# Patient Record
Sex: FEMALE | Race: ASIAN | Employment: FULL TIME | URBAN - METROPOLITAN AREA
[De-identification: names, ages, dates, MRNs, and addresses within clinical notes are randomized per-mention and may not be internally consistent; named-entity substitution may affect disease eponyms.]

---

## 2017-09-10 ENCOUNTER — HOSPITAL ENCOUNTER (EMERGENCY)
Facility: CLINIC | Age: 47
Discharge: HOME | End: 2017-09-10
Attending: PHYSICIAN ASSISTANT | Admitting: PHYSICIAN ASSISTANT
Payer: COMMERCIAL

## 2017-09-10 ENCOUNTER — APPOINTMENT (OUTPATIENT)
Dept: GENERAL RADIOLOGY | Facility: CLINIC | Age: 47
End: 2017-09-10
Attending: PHYSICIAN ASSISTANT
Payer: COMMERCIAL

## 2017-09-10 VITALS
TEMPERATURE: 98.1 F | OXYGEN SATURATION: 96 % | HEART RATE: 67 BPM | DIASTOLIC BLOOD PRESSURE: 64 MMHG | RESPIRATION RATE: 16 BRPM | WEIGHT: 115 LBS | SYSTOLIC BLOOD PRESSURE: 124 MMHG

## 2017-09-10 DIAGNOSIS — S62.232A OTHER CLOSED DISPLACED FRACTURE OF BASE OF FIRST METACARPAL BONE OF LEFT HAND, INITIAL ENCOUNTER: ICD-10-CM

## 2017-09-10 PROCEDURE — 73140 X-RAY EXAM OF FINGER(S): CPT | Mod: LT

## 2017-09-10 PROCEDURE — 26600 TREAT METACARPAL FRACTURE: CPT | Mod: LT

## 2017-09-10 PROCEDURE — 99284 EMERGENCY DEPT VISIT MOD MDM: CPT | Mod: 25

## 2017-09-10 ASSESSMENT — ENCOUNTER SYMPTOMS
NUMBNESS: 0
WEAKNESS: 0
WOUND: 0
ARTHRALGIAS: 1
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
## 2017-09-10 NOTE — DISCHARGE INSTRUCTIONS
Use Acetaminophen and/or Ibuprofen as needed for pain.      Elevate and follow-up with Orthopedic Hand Specialist in 3-5 days.     Return to the ED with worsening pain, numbness, tingling or if you become worse in any way.         Discharge Instructions  Extremity Injury    You were seen today for an injury to an extremity (arm, hand, leg, or foot). You may have a bruise, strain, or fracture (broken bone).    Return to the Emergency Department or see your regular doctor if your injured area is not back to normal within 5-7 days.    Return to the Emergency Department right away if:    Your pain seems to change or get worse or there is pain in a new area.    Your extremity becomes pale, cool, blue, or numb or tingling past the injury.    You have more drainage, redness or pain in the area of the cut or abrasion.    You have pain that you can t control with the medicine recommended or prescribed here, or you have pain that seems too much for your injury.    Your child will not stop crying or is much more fussy than normal.    You have new symptoms or anything that worries you.    What to Expect:    Your swelling and pain may be worse the day after your injury, but should not be severe and should start getting better after that. You should not have new symptoms and your pain should not get worse.    You may start to get a bruise over the injured area or below the injured area.    Your movement and strength should get better with time.    Some injuries may not show up until after you have left the Emergency Department so it is important to follow-up with your regular doctor.    Your injury may prevent you from working.  Follow-up with your regular doctor to get a work release note.    Pain medications or your injury may make it unsafe to drive or operate machinery.    Home Care:    Apply ice your injured area for 15 minutes at a time, at least 3 times a day. Use a cloth between the ice bag and your skin to prevent  frostbite.     Do not sleep with an ice pack or heating pad on, since this can cause burns or skin injury.    Rest your injured area for at least 1-2 days. After that you may start using your extremity again as long as there is not too much pain.     Raise the injured area above the level of your heart as much as possible in the first 1-2 days.    Use Tylenol  (acetaminophen), Motrin (ibuprofen), or Advil  (ibuprofen) for your pain unless you have an allergy or are told not to use these medications by your doctor.  Take the medications as instructed on the package. Tylenol  (acetaminophen) is in many prescription medicines and non-prescription medicines--check all of your medicines to be sure you aren t taking more than 3000 mg per day.    You may use an elastic bandage (Ace  Wrap) if it makes you more comfortable. Wrap it just tight enough to provide light compression, like a new pair of socks feels. Loosen the bandage if you have swelling past the bandage.      Please follow any other instructions that were discussed with you by your doctor.    MORE INFORMATION:    X-rays:  X-rays done today were read by your doctor but will also be read by a radiologist.  We will contact you if the radiologist sees anything different on the x-ray.  Your regular doctor may also want to review your x-rays on follow-up.    You could have a fracture (break), even if we told you your x-rays were normal. X-rays are not always certain, and some fractures are hard to see and may not show up right away.  Also, your x-ray may look like you have a fracture, even though you do not.  It is important to follow-up with your regular doctor.     Stretching:  If your injury was to your arm or shoulder and your doctor put you in a sling or an immobilizer, it is important that you take off your immobilizer within 3 days and stretch/move your shoulder, unless your doctor specifically tells you to not move your shoulder.  This is to prevent further  injury such as a  frozen shoulder .     If you were given a prescription for medicine here today, be sure to read all of the information (including the package insert) that comes with your prescription.  This will include important information about the medicine, its side effects, and any warnings that you need to know about.  The pharmacist who fills the prescription can provide more information and answer questions you may have about the medicine.  If you have questions or concerns that the pharmacist cannot address, please call or return to the Emergency Department.     Remember that you can always come back to the Emergency Department if you are not able to see your regular doctor in the amount of time listed above, if you get any new symptoms, or if there is anything that worries you.    Discharge Instructions  Splint Care    You had a splint put on today to help protect your injury and help it heal.  Splints are used to treat things like strains, sprains, cuts and fractures (broken bones).    Be sure your splint is not too tight!  If you splint is too tight, it may cause loss of blood supply.  Signs of your splint being too tight include:  your arm or leg hurting a lot more; your fingers or toes getting numb, cold, pale or blue; or your child is crying, fussing or seeming restless.    Return to the Emergency Department right away if:    You have increased pain or pressure around the injury.    You have numbness, tingling, or cool, pale, or blue toes or fingers past the injury.    Your child is more fussy than normal, crying a lot, or restless.    Your splint becomes soft, breaks, or is wet.    Your splint begins to smell bad.    Your splint is cutting into your skin.    Home care:    Keep the injured area above the level of your heart while laying or sitting down.  This will help decrease the swelling and the pain.    Keep the splint dry.    Do not put objects down or inside the splint.    If there is an  elastic bandage (Ace  wrap) holding the splint on this may be loosened slightly to relieve pressure or pain.  If pain continues return to the Emergency Department right away.    Do not remove your splint by yourself unless told to by your doctor.    Follow-up:  Sometimes the splint put on in the Emergency Department needs to be changed once the swelling has gone down and a more permanent cast needs to be placed.  This is usually done by a bone specialist doctor (Orthopedist).  Follow the instructions given to you by your doctor today.    X-rays:  X-rays done today were read by your doctor but will also be read by a radiologist.  We will contact you if the radiologist sees anything different on the x-ray.  Your regular doctor may also want to review your x-rays on follow-up.    You could have a fracture (break), even if we told you your x-rays were normal. X-rays are not always certain, and some fractures are hard to see and may not show up right away.  Also, your x-ray may look like you have a fracture, even though you do not.  It is important to follow-up with your regular doctor.     If you were given a prescription for medicine here today, be sure to read all of the information (including the package insert) that comes with your prescription.  This will include important information about the medicine, its side effects, and any warnings that you need to know about.  The pharmacist who fills the prescription can provide more information and answer questions you may have about the medicine.  If you have questions or concerns that the pharmacist cannot address, please call or return to the Emergency Department.       Closed Thumb Fracture  You have a broken (fractured) thumb. This causes local pain, swelling, and often bruising. This injury will usually take about 4 to 6 weeks or longer to heal. Thumb fractures may be treated with a splint or cast. This protects the thumb and holds the bone in place while it heals.  More serious fractures may need surgery.     If the thumbnail has been severely injured, it may fall off in 1 to 2 weeks. A new thumbnail will usually start to grow back within a month.  Home care  Follow these guidelines when caring for yourself at home:    Keep your arm elevated to reduce pain and swelling. When sitting or lying down elevate your arm above the level of your heart. You can do this by placing your arm on a pillow that rests on your chest or on a pillow at your side. This is most important during the first 2 days (48 hours) after the injury.    Put an ice pack on the injured area. Do this for 20 minutes every 1 to 2 hours the first day for pain relief. You can make an ice pack by wrapping a plastic bag of ice cubes in a thin towel. As the ice melts, be careful that the cast or splint doesn t get wet. Continue using the ice pack 3 to 4 times a day for the next 2 days. Then use the ice pack as needed to ease pain and swelling.    If a splint was put on, leave this in place for the time advised. This will keep the bones from moving out of position.    Keep the cast or splint completely dry at all times. Bathe with your cast or splint out of the water. Protect it with a large plastic bag, rubber-banded at the top end. If a fiberglass cast or splint gets wet, you can dry it with a hair dryer.    You may use acetaminophen or ibuprofen to control pain, unless another pain medicine was prescribed. If you have chronic liver or kidney disease, talk with your healthcare provider before using these medicines. Also talk with your provider if you ve had a stomach ulcer or gastrointestinal bleeding.    Don t put creams or objects under the cast if you have itching.  Follow-up care  Follow up with your healthcare provider in 1 week, or as advised. This is to make sure the bone is healing the way it should. Talk with your provider about when it is safe to return to sports or work.  X-rays may be taken. You will be  told of any new findings that may affect your care.  When to seek medical advice  Call your healthcare provider right away if any of these occur:    The cast or splint cracks    The plaster cast or splint becomes wet or soft    The fiberglass cast or splint stays wet for more than 24 hours    Bad odor from the cast or wound fluid stains the cast    Pain or swelling gets worse    Redness or warmth in the hand    Fingers or hand become cold, blue, numb, or tingly    You can t move your hand or fingers    Skin around cast or splint becomes red    Fever of 100.4 F (38 C) or higher, or as directed by your healthcare provider  Date Last Reviewed: 2/1/2017 2000-2017 [employer]. [address] [place] [address], [place] [id number]. All rights reserved. This information is not intended as a substitute for professional medical care. Always follow your healthcare professional's instructions.          Remember that you can always come back to the Emergency Department if you are not able to see your regular doctor in the amount of time listed above, if you get any new symptoms, or if there is anything that worries you.

## 2017-09-10 NOTE — ED AVS SNAPSHOT
Emergency Department    [address] [facility] [address]    Phone:  [phone]    Fax:  [phone]                                       Zenaida Blas   MRN: [id number]    Department:   Emergency Department   Date of Visit:  9/10/2017           After Visit Summary Signature Page     I have received my discharge instructions, and my questions have been answered. I have discussed any challenges I see with this plan with the nurse or doctor.    ..........................................................................................................................................  Patient/Patient Representative Signature      ..........................................................................................................................................  Patient Representative Print Name and Relationship to Patient    ..................................................               ................................................  Date                                            Time    ..........................................................................................................................................  Reviewed by Signature/Title    ...................................................              ..............................................  Date                                                            Time

## 2017-09-10 NOTE — ED PROVIDER NOTES
Emergency Department Attending Supervision Note  9/10/2017  3:53 PM      I evaluated this patient in conjunction with Beti KRAFT      Briefly, the patient presented with  left thumb pain after a fall. She denies numbness or tingling.      On my exam, cardiovascular: Regular rate and rhythm, brisk capillary refill in the injured thumb.  Musculoskeletal: tenderness at the base of the left thumb mild ecchymosis and edema.  Neurologic: Sensation intact distal to the injury.    My impression is left first metacarpal fracture, I agree with thumb spica splinting and close outpatient orthopedic follow-up per KARSTEN Garntet.        Rogers Mendoza Trigger       Trigger, Rogers Mendoza MD  09/10/17 2379

## 2017-09-10 NOTE — ED AVS SNAPSHOT
Emergency Department    [address] [facility] [address]    Phone:  [phone]    Fax:  [phone]                                       Zenaida Blas   MRN: [id number]    Department:   Emergency Department   Date of Visit:  9/10/2017           Patient Information     Date Of Birth          1970        Your diagnoses for this visit were:     Other closed displaced fracture of base of first metacarpal bone of left hand, initial encounter        You were seen by Beti Smith PA-C.      Follow-up Information     Follow up with  Emergency Department.    Specialty:  EMERGENCY MEDICINE    Why:  If symptoms worsen    Contact information:    [address] [facility] [address] [phone]        Follow up with Reyna Sheets MD.    Specialty:  Orthopedics    Contact information:    [facility]  [address] [place] [id number] [phone]          Discharge Instructions       Use Acetaminophen and/or Ibuprofen as needed for pain.      Elevate and follow-up with Orthopedic Hand Specialist in 3-5 days.     Return to the ED with worsening pain, numbness, tingling or if you become worse in any way.         Discharge Instructions  Extremity Injury    You were seen today for an injury to an extremity (arm, hand, leg, or foot). You may have a bruise, strain, or fracture (broken bone).    Return to the Emergency Department or see your regular doctor if your injured area is not back to normal within 5-7 days.    Return to the Emergency Department right away if:    Your pain seems to change or get worse or there is pain in a new area.    Your extremity becomes pale, cool, blue, or numb or tingling past the injury.    You have more drainage, redness or pain in the area of the cut or abrasion.    You have pain that you can t control with the medicine recommended or prescribed here, or you have pain that seems too much for your  injury.    Your child will not stop crying or is much more fussy than normal.    You have new symptoms or anything that worries you.    What to Expect:    Your swelling and pain may be worse the day after your injury, but should not be severe and should start getting better after that. You should not have new symptoms and your pain should not get worse.    You may start to get a bruise over the injured area or below the injured area.    Your movement and strength should get better with time.    Some injuries may not show up until after you have left the Emergency Department so it is important to follow-up with your regular doctor.    Your injury may prevent you from working.  Follow-up with your regular doctor to get a work release note.    Pain medications or your injury may make it unsafe to drive or operate machinery.    Home Care:    Apply ice your injured area for 15 minutes at a time, at least 3 times a day. Use a cloth between the ice bag and your skin to prevent frostbite.     Do not sleep with an ice pack or heating pad on, since this can cause burns or skin injury.    Rest your injured area for at least 1-2 days. After that you may start using your extremity again as long as there is not too much pain.     Raise the injured area above the level of your heart as much as possible in the first 1-2 days.    Use Tylenol  (acetaminophen), Motrin (ibuprofen), or Advil  (ibuprofen) for your pain unless you have an allergy or are told not to use these medications by your doctor.  Take the medications as instructed on the package. Tylenol  (acetaminophen) is in many prescription medicines and non-prescription medicines--check all of your medicines to be sure you aren t taking more than 3000 mg per day.    You may use an elastic bandage (Ace  Wrap) if it makes you more comfortable. Wrap it just tight enough to provide light compression, like a new pair of socks feels. Loosen the bandage if you have swelling past the  bandage.      Please follow any other instructions that were discussed with you by your doctor.    MORE INFORMATION:    X-rays:  X-rays done today were read by your doctor but will also be read by a radiologist.  We will contact you if the radiologist sees anything different on the x-ray.  Your regular doctor may also want to review your x-rays on follow-up.    You could have a fracture (break), even if we told you your x-rays were normal. X-rays are not always certain, and some fractures are hard to see and may not show up right away.  Also, your x-ray may look like you have a fracture, even though you do not.  It is important to follow-up with your regular doctor.     Stretching:  If your injury was to your arm or shoulder and your doctor put you in a sling or an immobilizer, it is important that you take off your immobilizer within 3 days and stretch/move your shoulder, unless your doctor specifically tells you to not move your shoulder.  This is to prevent further injury such as a  frozen shoulder .     If you were given a prescription for medicine here today, be sure to read all of the information (including the package insert) that comes with your prescription.  This will include important information about the medicine, its side effects, and any warnings that you need to know about.  The pharmacist who fills the prescription can provide more information and answer questions you may have about the medicine.  If you have questions or concerns that the pharmacist cannot address, please call or return to the Emergency Department.     Remember that you can always come back to the Emergency Department if you are not able to see your regular doctor in the amount of time listed above, if you get any new symptoms, or if there is anything that worries you.    Discharge Instructions  Splint Care    You had a splint put on today to help protect your injury and help it heal.  Splints are used to treat things like strains,  sprains, cuts and fractures (broken bones).    Be sure your splint is not too tight!  If you splint is too tight, it may cause loss of blood supply.  Signs of your splint being too tight include:  your arm or leg hurting a lot more; your fingers or toes getting numb, cold, pale or blue; or your child is crying, fussing or seeming restless.    Return to the Emergency Department right away if:    You have increased pain or pressure around the injury.    You have numbness, tingling, or cool, pale, or blue toes or fingers past the injury.    Your child is more fussy than normal, crying a lot, or restless.    Your splint becomes soft, breaks, or is wet.    Your splint begins to smell bad.    Your splint is cutting into your skin.    Home care:    Keep the injured area above the level of your heart while laying or sitting down.  This will help decrease the swelling and the pain.    Keep the splint dry.    Do not put objects down or inside the splint.    If there is an elastic bandage (Ace  wrap) holding the splint on this may be loosened slightly to relieve pressure or pain.  If pain continues return to the Emergency Department right away.    Do not remove your splint by yourself unless told to by your doctor.    Follow-up:  Sometimes the splint put on in the Emergency Department needs to be changed once the swelling has gone down and a more permanent cast needs to be placed.  This is usually done by a bone specialist doctor (Orthopedist).  Follow the instructions given to you by your doctor today.    X-rays:  X-rays done today were read by your doctor but will also be read by a radiologist.  We will contact you if the radiologist sees anything different on the x-ray.  Your regular doctor may also want to review your x-rays on follow-up.    You could have a fracture (break), even if we told you your x-rays were normal. X-rays are not always certain, and some fractures are hard to see and may not show up right away.  Also,  your x-ray may look like you have a fracture, even though you do not.  It is important to follow-up with your regular doctor.     If you were given a prescription for medicine here today, be sure to read all of the information (including the package insert) that comes with your prescription.  This will include important information about the medicine, its side effects, and any warnings that you need to know about.  The pharmacist who fills the prescription can provide more information and answer questions you may have about the medicine.  If you have questions or concerns that the pharmacist cannot address, please call or return to the Emergency Department.       Closed Thumb Fracture  You have a broken (fractured) thumb. This causes local pain, swelling, and often bruising. This injury will usually take about 4 to 6 weeks or longer to heal. Thumb fractures may be treated with a splint or cast. This protects the thumb and holds the bone in place while it heals. More serious fractures may need surgery.     If the thumbnail has been severely injured, it may fall off in 1 to 2 weeks. A new thumbnail will usually start to grow back within a month.  Home care  Follow these guidelines when caring for yourself at home:    Keep your arm elevated to reduce pain and swelling. When sitting or lying down elevate your arm above the level of your heart. You can do this by placing your arm on a pillow that rests on your chest or on a pillow at your side. This is most important during the first 2 days (48 hours) after the injury.    Put an ice pack on the injured area. Do this for 20 minutes every 1 to 2 hours the first day for pain relief. You can make an ice pack by wrapping a plastic bag of ice cubes in a thin towel. As the ice melts, be careful that the cast or splint doesn t get wet. Continue using the ice pack 3 to 4 times a day for the next 2 days. Then use the ice pack as needed to ease pain and swelling.    If a splint was  put on, leave this in place for the time advised. This will keep the bones from moving out of position.    Keep the cast or splint completely dry at all times. Bathe with your cast or splint out of the water. Protect it with a large plastic bag, rubber-banded at the top end. If a fiberglass cast or splint gets wet, you can dry it with a hair dryer.    You may use acetaminophen or ibuprofen to control pain, unless another pain medicine was prescribed. If you have chronic liver or kidney disease, talk with your healthcare provider before using these medicines. Also talk with your provider if you ve had a stomach ulcer or gastrointestinal bleeding.    Don t put creams or objects under the cast if you have itching.  Follow-up care  Follow up with your healthcare provider in 1 week, or as advised. This is to make sure the bone is healing the way it should. Talk with your provider about when it is safe to return to sports or work.  X-rays may be taken. You will be told of any new findings that may affect your care.  When to seek medical advice  Call your healthcare provider right away if any of these occur:    The cast or splint cracks    The plaster cast or splint becomes wet or soft    The fiberglass cast or splint stays wet for more than 24 hours    Bad odor from the cast or wound fluid stains the cast    Pain or swelling gets worse    Redness or warmth in the hand    Fingers or hand become cold, blue, numb, or tingly    You can t move your hand or fingers    Skin around cast or splint becomes red    Fever of 100.4 F (38 C) or higher, or as directed by your healthcare provider  Date Last Reviewed: 2/1/2017 2000-2017 [employer]. [address] [place] [address]. All rights reserved. This information is not intended as a substitute for professional medical care. Always follow your healthcare professional's instructions.          Remember that you can always come back to the Emergency Department  if you are not able to see your regular doctor in the amount of time listed above, if you get any new symptoms, or if there is anything that worries you.      24 Hour Appointment Hotline       To make an appointment at any [facility], call [phone] ([phone]). If you don't have a family doctor or clinic, we will help you find one. [facility] clinics are conveniently located to serve the needs of you and your family.             Review of your medicines      Notice     You have not been prescribed any medications.            Procedures and tests performed during your visit     Fingers XR, 2-3 views, left      Orders Needing Specimen Collection     None      Pending Results     No orders found from 9/8/2017 to 9/11/2017.            Pending Culture Results     No orders found from 9/8/2017 to 9/11/2017.            Pending Results Instructions     If you had any lab results that were not finalized at the time of your Discharge, you can call the ED Lab Result RN at [phone]. You will be contacted by this team for any positive Lab results or changes in treatment. The nurses are available 7 days a week from 10A to 6:30P.  You can leave a message 24 hours per day and they will return your call.        Test Results From Your Hospital Stay        9/10/2017  3:14 PM      Narrative     FINGER LEFT TWO OR MORE VIEWS   9/10/2017 3:03 PM     HISTORY: Left thumb MCP tenderness and swelling after jamming injury.    COMPARISON: None.    FINDINGS: Minimally displaced fracture at the base of the left first  metacarpal. Exam otherwise negative.        Impression     IMPRESSION: Left first metacarpal fracture.    JAVIER ANN MD                Clinical Quality Measure: Blood Pressure Screening     Your blood pressure was checked while you were in the emergency department today. The last reading we obtained was  BP: 124/64 . Please read the guidelines below about what these numbers mean and what you should do about  "them.  If your systolic blood pressure (the top number) is less than 120 and your diastolic blood pressure (the bottom number) is less than 80, then your blood pressure is normal. There is nothing more that you need to do about it.  If your systolic blood pressure (the top number) is 120-139 or your diastolic blood pressure (the bottom number) is 80-89, your blood pressure may be higher than it should be. You should have your blood pressure rechecked within a year by a primary care provider.  If your systolic blood pressure (the top number) is 140 or greater or your diastolic blood pressure (the bottom number) is 90 or greater, you may have high blood pressure. High blood pressure is treatable, but if left untreated over time it can put you at risk for heart attack, stroke, or kidney failure. You should have your blood pressure rechecked by a primary care provider within the next 4 weeks.  If your provider in the emergency department today gave you specific instructions to follow-up with your doctor or provider even sooner than that, you should follow that instruction and not wait for up to 4 weeks for your follow-up visit.        Thank you for choosing [facility]       Thank you for choosing [facility] for your care. Our goal is always to provide you with excellent care. Hearing back from our patients is one way we can continue to improve our services. Please take a few minutes to complete the written survey that you may receive in the mail after you visit with us. Thank you!        [employer] Information     [employer] lets you send messages to your doctor, view your test results, renew your prescriptions, schedule appointments and more. To sign up, go to [email or web address] . Click on \"Log in\" on the left side of the screen, which will take you to the Welcome page. Then click on \"Sign up Now\" on the right side of the page.     You will be asked to enter the access code listed below, as well as some personal " information. Please follow the directions to create your username and password.     Your access code is: [email or web address]  Expires: 2017  4:03 PM     Your access code will  in 90 days. If you need help or a new code, please call your [facility] clinic or [phone].        Care EveryWhere ID     This is your Care EveryWhere ID. This could be used by other organizations to access your [facility] medical records  [id number]        Equal Access to Services     [facility] Center[facility] : René reynosoo Somiah, waaxda luqadaha, qaybta kaalmada adealtagracia, arturo dickey . [facility] [phone].    ATENCIÓN: Si habla español, tiene a weller disposición servicios gratuitos de asistencia lingüística. Llame al [phone].    We comply with applicable federal civil rights laws and [place] laws. We do not discriminate on the basis of race, color, national origin, age, disability sex, sexual orientation or gender identity.            After Visit Summary       This is your record. Keep this with you and show to your community pharmacist(s) and doctor(s) at your next visit.

## 2021-03-30 ENCOUNTER — HOSPITAL ENCOUNTER (EMERGENCY)
Facility: CLINIC | Age: 51
Discharge: HOME | End: 2021-03-30
Attending: EMERGENCY MEDICINE | Admitting: EMERGENCY MEDICINE
Payer: COMMERCIAL

## 2021-03-30 ENCOUNTER — APPOINTMENT (OUTPATIENT)
Dept: GENERAL RADIOLOGY | Facility: CLINIC | Age: 51
End: 2021-03-30
Attending: EMERGENCY MEDICINE
Payer: COMMERCIAL

## 2021-03-30 VITALS
TEMPERATURE: 97.7 F | DIASTOLIC BLOOD PRESSURE: 51 MMHG | OXYGEN SATURATION: 96 % | RESPIRATION RATE: 18 BRPM | SYSTOLIC BLOOD PRESSURE: 130 MMHG | BODY MASS INDEX: 22.66 KG/M2 | HEART RATE: 60 BPM | HEIGHT: 61 IN | WEIGHT: 120 LBS

## 2021-03-30 DIAGNOSIS — S93.402A SPRAIN OF LEFT ANKLE, UNSPECIFIED LIGAMENT, INITIAL ENCOUNTER: ICD-10-CM

## 2021-03-30 PROCEDURE — 99284 EMERGENCY DEPT VISIT MOD MDM: CPT

## 2021-03-30 PROCEDURE — 73610 X-RAY EXAM OF ANKLE: CPT | Mod: LT

## 2021-03-30 ASSESSMENT — MIFFLIN-ST. JEOR: SCORE: 1111.7

## 2021-03-31 ASSESSMENT — ENCOUNTER SYMPTOMS
WEAKNESS: 0
NUMBNESS: 0

## 2021-03-31 NOTE — ED PROVIDER NOTES
"  History   Chief Complaint:  Left Ankle Injury       The history is provided by the patient.      Zenaida Blas is a 50 year old female who presents with left ankle injury. At 1800, during her step class, she missed the step and injured her left ankle. She did not take any pain medications. She denies any other injuries.    Review of Systems   Musculoskeletal:        Left ankle injury.   Neurological: Negative for weakness and numbness.   All other systems reviewed and are negative.        Allergies:  No Known Allergies    Medications:  No current medications     Past Medical History:    No past medical history    Social History:  Presents with relative.   Exercises at step class.     Physical Exam     Patient Vitals for the past 24 hrs:   BP Temp Temp src Pulse Resp SpO2 Height Weight   03/30/21 2207 130/51 97.7  F (36.5  C) Oral 60 18 96 % 1.549 m (5' 1\") 54.4 kg (120 lb)       Physical Exam  General: Appears well-developed and well-nourished.   Head: No signs of trauma.   CV: Normal rate and regular rhythm.    Resp: Effort normal. No respiratory distress.   MSK: +tenderness and swelling to left lateral ankle.  No tenderness to foot, knee, or remainder of leg.  Distal pulses intact.  Normal strength, cap refill, and sensation distally.  Neuro: The patient is alert and oriented. Speech normal.  Skin: Skin is warm and dry. No rash noted.   Psych: normal mood and affect. behavior is normal.       Emergency Department Course     Imaging:  XR Ankle Left G/E 3 Views  IMPRESSION: Moderate soft tissue swelling overlying the lateral malleolus. No fracture or dislocation.  Reading per radiology    Procedures    Emergency Department Course:    Reviewed:  I reviewed nursing notes, vitals and past medical history    Assessments:  2231 I obtained history and examined the patient as noted above.   2322 I rechecked the patient and explained findings.     Disposition:  The patient was discharged to home.     Impression " & Plan     Medical Decision Making:  Zenaida Blas is a 50-year-old woman presents due to left ankle pain.  She was in step class and missed a step and rolled her ankle.  Evaluation there is some swelling to the lateral aspect of the left ankle but no clear bony deformities.  There is no knee pain or other injuries.  X-rays obtained that did not show any bony injury.  Patient was placed in a cam boot and given crutches for treatment of a sprain.  She was recommended to follow-up with orthopedics and I did discuss the possibility of an occult fracture.      Diagnosis:    ICD-10-CM    1. Sprain of left ankle, unspecified ligament, initial encounter  S93.402A      Scribe Disclosure:  I, Dylon Patel, am serving as a scribe at 10:31 PM on 3/30/2021 to document services personally performed by Yvan Denise MD based on my observations and the provider's statements to me.          Yvan Denise MD  03/31/21 034